# Patient Record
Sex: MALE | Race: WHITE | Employment: FULL TIME | URBAN - METROPOLITAN AREA
[De-identification: names, ages, dates, MRNs, and addresses within clinical notes are randomized per-mention and may not be internally consistent; named-entity substitution may affect disease eponyms.]

---

## 2018-08-30 PROBLEM — Z80.0 FH: COLON CANCER: Status: ACTIVE | Noted: 2018-08-30

## 2018-08-30 PROBLEM — Z80.0 FH: GASTRIC CANCER: Status: ACTIVE | Noted: 2018-08-30

## 2018-09-21 PROCEDURE — 88305 TISSUE EXAM BY PATHOLOGIST: CPT | Performed by: INTERNAL MEDICINE

## 2020-03-15 ENCOUNTER — HOSPITAL ENCOUNTER (EMERGENCY)
Facility: HOSPITAL | Age: 50
Discharge: HOME | End: 2020-03-15
Attending: EMERGENCY MEDICINE
Payer: COMMERCIAL

## 2020-03-15 VITALS
OXYGEN SATURATION: 99 % | BODY MASS INDEX: 31.36 KG/M2 | WEIGHT: 224 LBS | RESPIRATION RATE: 18 BRPM | DIASTOLIC BLOOD PRESSURE: 88 MMHG | SYSTOLIC BLOOD PRESSURE: 150 MMHG | HEART RATE: 72 BPM | TEMPERATURE: 98 F | HEIGHT: 71 IN

## 2020-03-15 DIAGNOSIS — J06.9 UPPER RESPIRATORY TRACT INFECTION, UNSPECIFIED TYPE: Primary | ICD-10-CM

## 2020-03-15 PROCEDURE — 99282 EMERGENCY DEPT VISIT SF MDM: CPT

## 2020-03-15 NOTE — ED PROVIDER NOTES
Patient Seen in: [facility] Emergency Department      History   Patient presents with:  Cough/URI  Fatigue    Stated Complaint: cough, no fever, weakness, sore throat since Wednesday    HPI    Patient is a healthy 14-year-old male who presents with All other systems reviewed and negative except as noted above.     Physical Exam     ED Triage Vitals [03/15/20 1550]   BP (!) 184/102   Pulse 67   Resp 17   Temp 98.3 °F (36.8 °C)   Temp src Oral   SpO2 97 %   O2 Device None (Room air)       Current:BP tract infection, unspecified type  (primary encounter diagnosis)    Disposition:  Discharge  3/15/2020  5:06 pm    Follow-up:  Martir Greenwood, [address] [facility] [id number] [phone]              Medications Prescribed:  Current Dischar

## 2020-03-15 NOTE — ED INITIAL ASSESSMENT (HPI)
Onset Wednesday with runny nose, cough. Night sweats, headache, lightheadedness began that evening. Wife recovering from cancer. +Recent travel to [facility] and [place] on planes, ran in a large race last weekend. Denies fever.

## (undated) NOTE — ED AVS SNAPSHOT
Marcelina Flores   MRN: [id number]    Department:  [facility] Emergency Department   Date of Visit:  3/15/2020           Disclosure     Insurance plans vary and the physician(s) referred by the ER may not be covered by your plan.  Please contact yo tell this physician (or your personal doctor if your instructions are to return to your personal doctor) about any new or lasting problems. The primary care or specialist physician will see patients referred from the [facility] Emergency Department.  Cori Cancino